# Patient Record
Sex: MALE | Race: WHITE | NOT HISPANIC OR LATINO | Employment: FULL TIME | URBAN - METROPOLITAN AREA
[De-identification: names, ages, dates, MRNs, and addresses within clinical notes are randomized per-mention and may not be internally consistent; named-entity substitution may affect disease eponyms.]

---

## 2021-08-26 ENCOUNTER — HOSPITAL ENCOUNTER (EMERGENCY)
Facility: HOSPITAL | Age: 31
Discharge: HOME | End: 2021-08-26
Attending: EMERGENCY MEDICINE
Payer: COMMERCIAL

## 2021-08-26 VITALS
OXYGEN SATURATION: 98 % | SYSTOLIC BLOOD PRESSURE: 108 MMHG | TEMPERATURE: 97.6 F | DIASTOLIC BLOOD PRESSURE: 62 MMHG | HEART RATE: 54 BPM | RESPIRATION RATE: 16 BRPM

## 2021-08-26 DIAGNOSIS — R55 SYNCOPE: Primary | ICD-10-CM

## 2021-08-26 LAB
ATRIAL RATE: 56 BPM
P AXIS: 71 DEGREES
PR INTERVAL: 134 MS
QRS AXIS: 86 DEGREES
QRSD INTERVAL: 94 MS
QT INTERVAL: 426 MS
QTC INTERVAL: 411 MS
T WAVE AXIS: 83 DEGREES
VENTRICULAR RATE: 56 BPM

## 2021-08-26 PROCEDURE — 99284 EMERGENCY DEPT VISIT MOD MDM: CPT

## 2021-08-26 PROCEDURE — 93005 ELECTROCARDIOGRAM TRACING: CPT

## 2021-08-26 PROCEDURE — 93010 ELECTROCARDIOGRAM REPORT: CPT | Performed by: INTERNAL MEDICINE

## 2021-08-26 PROCEDURE — 99283 EMERGENCY DEPT VISIT LOW MDM: CPT | Performed by: PHYSICIAN ASSISTANT

## 2021-08-26 NOTE — ED PROVIDER NOTES
History  Chief Complaint   Patient presents with    Syncope     pt received his first covid 19 vaccine when 2-3 minutes later he passed out and had "seizure-like" activity per girlfriend  pt said he felt light headed and then real warm  pt denies any itching, sob, or chest pain  Patient is a 34 y/o M that presents to the ED after a syncopal episode when receiving his Pfizer covid vaccine  He states he was sitting in a chair and states it felt like he fell asleep and then he states he just came to and feels a little tired  He states he did not eat anything today  Patient states he feels a little tired, no other complaints  No chest pain or SOB  Wife saw him pass out  She states his head slumped forward and he started to shake, but it only lasted a couple seconds  No confusion after the episode  History provided by:  Patient  Syncope  Episode history:  Single  Most recent episode: Today  Timing:  Constant  Progression:  Resolved  Chronicity:  New  Context comment:  While receiving vaccine  Witnessed: yes    Worsened by:  Nothing  Ineffective treatments:  None tried  Associated symptoms: malaise/fatigue    Associated symptoms: no chest pain, no confusion, no diaphoresis, no difficulty breathing, no dizziness, no fever, no focal weakness, no headaches, no nausea, no palpitations, no shortness of breath, no vomiting and no weakness    Risk factors: no congenital heart disease, no coronary artery disease, no seizures and no vascular disease        None       History reviewed  No pertinent past medical history  History reviewed  No pertinent surgical history  History reviewed  No pertinent family history  I have reviewed and agree with the history as documented      E-Cigarette/Vaping     E-Cigarette/Vaping Substances    Nicotine No     THC No     CBD No     Flavoring No     Other No     Unknown No      Social History     Tobacco Use    Smoking status: Never Smoker    Smokeless tobacco: Never Used   Vaping Use    Vaping Use: Never assessed   Substance Use Topics    Alcohol use: Not Currently    Drug use: Never       Review of Systems   Constitutional: Positive for malaise/fatigue  Negative for diaphoresis and fever  HENT: Negative  Eyes: Negative for visual disturbance  Respiratory: Negative for cough and shortness of breath  Cardiovascular: Positive for syncope  Negative for chest pain and palpitations  Gastrointestinal: Negative for abdominal pain, nausea and vomiting  Genitourinary: Negative for dysuria  Musculoskeletal: Negative for back pain and neck pain  Skin: Negative for color change and rash  Neurological: Positive for syncope and light-headedness  Negative for dizziness, focal weakness, speech difficulty, weakness, numbness and headaches  Psychiatric/Behavioral: Negative for confusion  All other systems reviewed and are negative  Physical Exam  Physical Exam  Vitals and nursing note reviewed  Constitutional:       General: He is not in acute distress  Appearance: Normal appearance  He is well-developed, well-groomed and normal weight  He is not ill-appearing or diaphoretic  HENT:      Head: Normocephalic and atraumatic  Right Ear: Hearing normal       Left Ear: Hearing normal       Nose: Nose normal    Eyes:      Conjunctiva/sclera: Conjunctivae normal       Pupils: Pupils are equal    Cardiovascular:      Rate and Rhythm: Regular rhythm  Bradycardia present  Heart sounds: Normal heart sounds  No murmur heard  Pulmonary:      Effort: Pulmonary effort is normal       Breath sounds: Normal breath sounds  No wheezing, rhonchi or rales  Abdominal:      General: Abdomen is flat  Bowel sounds are normal       Tenderness: There is no abdominal tenderness  Musculoskeletal:         General: Normal range of motion  Cervical back: Normal range of motion  Right lower leg: No edema  Left lower leg: No edema     Skin:     General: Skin is warm and dry  Coloration: Skin is not jaundiced or pale  Findings: No rash  Neurological:      General: No focal deficit present  Mental Status: He is alert and oriented to person, place, and time  Motor: No weakness  Psychiatric:         Mood and Affect: Mood normal          Behavior: Behavior is cooperative  Vital Signs  ED Triage Vitals [08/26/21 1055]   Temperature Pulse Respirations Blood Pressure SpO2   97 6 °F (36 4 °C) (!) 54 16 108/62 98 %      Temp src Heart Rate Source Patient Position - Orthostatic VS BP Location FiO2 (%)   -- -- -- -- --      Pain Score       --           Vitals:    08/26/21 1055   BP: 108/62   Pulse: (!) 54         Visual Acuity      ED Medications  Medications - No data to display    Diagnostic Studies  Results Reviewed     None                 No orders to display              Procedures  ECG 12 Lead Documentation Only    Date/Time: 8/26/2021 11:29 AM  Performed by: Esteban Byrne PA-C  Authorized by: Esteban Byrne PA-C     Indications / Diagnosis:  Syncope  ECG reviewed by me, the ED Provider: yes    Patient location:  ED  Previous ECG:     Previous ECG:  Unavailable  Rate:     ECG rate:  56    ECG rate assessment: bradycardic    Rhythm:     Rhythm: sinus bradycardia    Conduction:     Conduction: normal    ST segments:     ST segments:  Normal  T waves:     T waves: normal               ED Course  ED Course as of Aug 26 1148   Thu Aug 26, 2021   1121 Patient given juice and crackers since he did not eat anything today  SBIRT 20yo+      Most Recent Value   SBIRT (24 yo +)   In order to provide better care to our patients, we are screening all of our patients for alcohol and drug use  Would it be okay to ask you these screening questions?   No Filed at: 08/26/2021 1111                    MDM  Number of Diagnoses or Management Options  Syncope: new and requires workup  Diagnosis management comments: Patient with syncope after vaccine, most likely vasovagal response for vaccine, patient improved, EKG normal       Patient Progress  Patient progress: resolved      Disposition  Final diagnoses:   Syncope     Time reflects when diagnosis was documented in both MDM as applicable and the Disposition within this note     Time User Action Codes Description Comment    8/26/2021 11:24 AM Bayron Martinez Add [R55] Syncope       ED Disposition     ED Disposition Condition Date/Time Comment    Discharge Stable Thu Aug 26, 2021 11:24 AM Jorge Bell discharge to home/self care  Follow-up Information     Follow up With Specialties Details Why Contact Info    your family doctor  Call in 1 day For recheck           Patient's Medications    No medications on file     No discharge procedures on file      PDMP Review     None          ED Provider  Electronically Signed by           Carl Keating PA-C  08/26/21 7590

## 2021-08-26 NOTE — DISCHARGE INSTRUCTIONS
Rest, increase fluids  Follow up with family doctor as needed for recheck  Return to ER if symptoms worsen